# Patient Record
Sex: FEMALE | Race: WHITE | NOT HISPANIC OR LATINO | Employment: OTHER | URBAN - METROPOLITAN AREA
[De-identification: names, ages, dates, MRNs, and addresses within clinical notes are randomized per-mention and may not be internally consistent; named-entity substitution may affect disease eponyms.]

---

## 2024-03-06 RX ORDER — OXYCODONE HYDROCHLORIDE 5 MG/1
5 TABLET ORAL EVERY 4 HOURS PRN
COMMUNITY

## 2024-03-06 ASSESSMENT — ACTIVITIES OF DAILY LIVING (ADL)
ADL_SCORE: 12
SENSORY_SUPPORT_DEVICES: EYEGLASSES;DENTURES
ADL_BEFORE_ADMISSION: INDEPENDENT

## 2024-03-07 ENCOUNTER — ANESTHESIA EVENT (OUTPATIENT)
Dept: SURGERY | Age: 69
End: 2024-03-07

## 2024-03-07 ENCOUNTER — ANESTHESIA (OUTPATIENT)
Dept: SURGERY | Age: 69
End: 2024-03-07

## 2024-03-07 ENCOUNTER — HOSPITAL ENCOUNTER (OUTPATIENT)
Age: 69
Discharge: HOME | End: 2024-03-07
Attending: STUDENT IN AN ORGANIZED HEALTH CARE EDUCATION/TRAINING PROGRAM | Admitting: STUDENT IN AN ORGANIZED HEALTH CARE EDUCATION/TRAINING PROGRAM

## 2024-03-07 DIAGNOSIS — D64.9 ANEMIA, UNSPECIFIED TYPE: Primary | ICD-10-CM

## 2024-03-07 LAB
ATRIAL RATE (BPM): 76
HGB BLD-MCNC: 11.4 G/DL (ref 12–15.5)
P AXIS (DEGREES): 52
PR-INTERVAL (MSEC): 144
QRS-INTERVAL (MSEC): 84
QT-INTERVAL (MSEC): 400
QTC: 450
R AXIS (DEGREES): 29
REPORT TEXT: NORMAL
T AXIS (DEGREES): 7
VENTRICULAR RATE EKG/MIN (BPM): 76

## 2024-03-07 PROCEDURE — 13000002 HB ANESTHESIA  GENERAL  S/U + 1ST 15 MIN: Performed by: STUDENT IN AN ORGANIZED HEALTH CARE EDUCATION/TRAINING PROGRAM

## 2024-03-07 PROCEDURE — 10002807 HB RX 258: Performed by: ANESTHESIOLOGY

## 2024-03-07 PROCEDURE — 10004452 HB PACU ADDL 30 MINUTES: Performed by: STUDENT IN AN ORGANIZED HEALTH CARE EDUCATION/TRAINING PROGRAM

## 2024-03-07 PROCEDURE — 10004451 HB PACU RECOVERY 1ST 30 MINUTES: Performed by: STUDENT IN AN ORGANIZED HEALTH CARE EDUCATION/TRAINING PROGRAM

## 2024-03-07 PROCEDURE — 10002800 HB RX 250 W HCPCS

## 2024-03-07 PROCEDURE — 10002803 HB RX 637: Performed by: STUDENT IN AN ORGANIZED HEALTH CARE EDUCATION/TRAINING PROGRAM

## 2024-03-07 PROCEDURE — 13000003 HB ANESTHESIA  GENERAL EA ADD MINUTE: Performed by: STUDENT IN AN ORGANIZED HEALTH CARE EDUCATION/TRAINING PROGRAM

## 2024-03-07 PROCEDURE — 10002800 HB RX 250 W HCPCS: Performed by: ANESTHESIOLOGY

## 2024-03-07 PROCEDURE — 88305 TISSUE EXAM BY PATHOLOGIST: CPT | Performed by: STUDENT IN AN ORGANIZED HEALTH CARE EDUCATION/TRAINING PROGRAM

## 2024-03-07 PROCEDURE — 85018 HEMOGLOBIN: CPT

## 2024-03-07 PROCEDURE — 13000034 HB BASIC CASE  S/U +1ST 15 MIN: Performed by: STUDENT IN AN ORGANIZED HEALTH CARE EDUCATION/TRAINING PROGRAM

## 2024-03-07 PROCEDURE — 93005 ELECTROCARDIOGRAM TRACING: CPT

## 2024-03-07 PROCEDURE — 13000001 HB PHASE II RECOVERY EA 30 MINUTES: Performed by: STUDENT IN AN ORGANIZED HEALTH CARE EDUCATION/TRAINING PROGRAM

## 2024-03-07 PROCEDURE — 10002801 HB RX 250 W/O HCPCS: Performed by: ANESTHESIOLOGY

## 2024-03-07 PROCEDURE — 10002801 HB RX 250 W/O HCPCS

## 2024-03-07 PROCEDURE — 13000035 HB BASIC CASE EA ADD MINUTE: Performed by: STUDENT IN AN ORGANIZED HEALTH CARE EDUCATION/TRAINING PROGRAM

## 2024-03-07 RX ORDER — MIDAZOLAM HYDROCHLORIDE 1 MG/ML
INJECTION, SOLUTION INTRAMUSCULAR; INTRAVENOUS PRN
Status: DISCONTINUED | OUTPATIENT
Start: 2024-03-07 | End: 2024-03-07

## 2024-03-07 RX ORDER — SODIUM CHLORIDE, SODIUM LACTATE, POTASSIUM CHLORIDE, CALCIUM CHLORIDE 600; 310; 30; 20 MG/100ML; MG/100ML; MG/100ML; MG/100ML
INJECTION, SOLUTION INTRAVENOUS CONTINUOUS
Status: DISCONTINUED | OUTPATIENT
Start: 2024-03-07 | End: 2024-03-07 | Stop reason: HOSPADM

## 2024-03-07 RX ORDER — SODIUM CHLORIDE 9 MG/ML
INJECTION, SOLUTION INTRAVENOUS CONTINUOUS
Status: DISCONTINUED | OUTPATIENT
Start: 2024-03-07 | End: 2024-03-07 | Stop reason: HOSPADM

## 2024-03-07 RX ORDER — HYDRALAZINE HYDROCHLORIDE 20 MG/ML
5 INJECTION INTRAMUSCULAR; INTRAVENOUS EVERY 10 MIN PRN
Status: DISCONTINUED | OUTPATIENT
Start: 2024-03-07 | End: 2024-03-07 | Stop reason: HOSPADM

## 2024-03-07 RX ORDER — PROPOFOL 10 MG/ML
INJECTION, EMULSION INTRAVENOUS PRN
Status: DISCONTINUED | OUTPATIENT
Start: 2024-03-07 | End: 2024-03-07

## 2024-03-07 RX ORDER — SODIUM CHLORIDE, SODIUM LACTATE, POTASSIUM CHLORIDE, CALCIUM CHLORIDE 600; 310; 30; 20 MG/100ML; MG/100ML; MG/100ML; MG/100ML
INJECTION, SOLUTION INTRAVENOUS CONTINUOUS PRN
Status: DISCONTINUED | OUTPATIENT
Start: 2024-03-07 | End: 2024-03-07

## 2024-03-07 RX ORDER — LIDOCAINE HYDROCHLORIDE 10 MG/ML
5-10 INJECTION, SOLUTION EPIDURAL; INFILTRATION; INTRACAUDAL; PERINEURAL PRN
Status: DISCONTINUED | OUTPATIENT
Start: 2024-03-07 | End: 2024-03-07 | Stop reason: HOSPADM

## 2024-03-07 RX ORDER — OXYCODONE HYDROCHLORIDE 5 MG/1
5 TABLET ORAL EVERY 4 HOURS PRN
Status: DISCONTINUED | OUTPATIENT
Start: 2024-03-07 | End: 2024-03-07 | Stop reason: HOSPADM

## 2024-03-07 RX ORDER — ONDANSETRON 2 MG/ML
INJECTION INTRAMUSCULAR; INTRAVENOUS PRN
Status: DISCONTINUED | OUTPATIENT
Start: 2024-03-07 | End: 2024-03-07

## 2024-03-07 RX ORDER — DEXAMETHASONE SODIUM PHOSPHATE 4 MG/ML
INJECTION, SOLUTION INTRA-ARTICULAR; INTRALESIONAL; INTRAMUSCULAR; INTRAVENOUS; SOFT TISSUE PRN
Status: DISCONTINUED | OUTPATIENT
Start: 2024-03-07 | End: 2024-03-07

## 2024-03-07 RX ADMIN — HYDROMORPHONE HYDROCHLORIDE 0.2 MG: 1 INJECTION, SOLUTION INTRAMUSCULAR; INTRAVENOUS; SUBCUTANEOUS at 13:36

## 2024-03-07 RX ADMIN — HYDROMORPHONE HYDROCHLORIDE 0.2 MG: 1 INJECTION, SOLUTION INTRAMUSCULAR; INTRAVENOUS; SUBCUTANEOUS at 13:42

## 2024-03-07 RX ADMIN — FENTANYL CITRATE 25 MCG: 50 INJECTION INTRAMUSCULAR; INTRAVENOUS at 13:38

## 2024-03-07 RX ADMIN — SODIUM CHLORIDE, POTASSIUM CHLORIDE, SODIUM LACTATE AND CALCIUM CHLORIDE: 600; 310; 30; 20 INJECTION, SOLUTION INTRAVENOUS at 12:34

## 2024-03-07 RX ADMIN — FENTANYL CITRATE 100 MCG: 50 INJECTION INTRAMUSCULAR; INTRAVENOUS at 12:39

## 2024-03-07 RX ADMIN — SODIUM CHLORIDE, POTASSIUM CHLORIDE, SODIUM LACTATE AND CALCIUM CHLORIDE: 600; 310; 30; 20 INJECTION, SOLUTION INTRAVENOUS at 10:32

## 2024-03-07 RX ADMIN — OXYCODONE HYDROCHLORIDE 5 MG: 5 TABLET ORAL at 14:46

## 2024-03-07 RX ADMIN — HYDROMORPHONE HYDROCHLORIDE 0.2 MG: 1 INJECTION, SOLUTION INTRAMUSCULAR; INTRAVENOUS; SUBCUTANEOUS at 13:30

## 2024-03-07 RX ADMIN — PROPOFOL 100 MG: 10 INJECTION, EMULSION INTRAVENOUS at 12:40

## 2024-03-07 RX ADMIN — FENTANYL CITRATE 25 MCG: 50 INJECTION INTRAMUSCULAR; INTRAVENOUS at 13:21

## 2024-03-07 RX ADMIN — ONDANSETRON 4 MG: 2 INJECTION INTRAMUSCULAR; INTRAVENOUS at 12:44

## 2024-03-07 RX ADMIN — MIDAZOLAM HYDROCHLORIDE 2 MG: 1 INJECTION, SOLUTION INTRAMUSCULAR; INTRAVENOUS at 12:39

## 2024-03-07 RX ADMIN — DEXAMETHASONE SODIUM PHOSPHATE 4 MG: 4 INJECTION INTRA-ARTICULAR; INTRALESIONAL; INTRAMUSCULAR; INTRAVENOUS; SOFT TISSUE at 12:44

## 2024-03-07 RX ADMIN — HYDROMORPHONE HYDROCHLORIDE 0.2 MG: 1 INJECTION, SOLUTION INTRAMUSCULAR; INTRAVENOUS; SUBCUTANEOUS at 13:55

## 2024-03-07 ASSESSMENT — PAIN SCALES - GENERAL
PAINLEVEL_OUTOF10: 3
PAINLEVEL_OUTOF10: 7
PAINLEVEL_OUTOF10: 7
PAINLEVEL_OUTOF10: 0
PAINLEVEL_OUTOF10: 6
PAINLEVEL_OUTOF10: 7
PAINLEVEL_OUTOF10: 6
PAINLEVEL_OUTOF10: 0
PAINLEVEL_OUTOF10: 7
PAINLEVEL_OUTOF10: 7
PAINLEVEL_OUTOF10: 5
PAINLEVEL_OUTOF10: 4

## 2024-03-07 ASSESSMENT — PAIN DESCRIPTION - PAIN TYPE
TYPE: ACUTE PAIN

## 2024-03-08 VITALS
WEIGHT: 139.99 LBS | HEIGHT: 67 IN | RESPIRATION RATE: 16 BRPM | OXYGEN SATURATION: 97 % | BODY MASS INDEX: 21.97 KG/M2 | SYSTOLIC BLOOD PRESSURE: 135 MMHG | TEMPERATURE: 97.4 F | DIASTOLIC BLOOD PRESSURE: 73 MMHG | HEART RATE: 60 BPM

## 2024-03-12 LAB
ASR DISCLAIMER: NORMAL
CASE RPRT: NORMAL
CLINICAL INFO: NORMAL
PATH REPORT.FINAL DX SPEC: NORMAL
PATH REPORT.GROSS SPEC: NORMAL

## 2024-05-02 RX ORDER — CEFAZOLIN SODIUM/WATER 2 G/20 ML
2 SYRINGE (ML) INTRAVENOUS ONCE
Status: CANCELLED | OUTPATIENT
Start: 2024-05-02 | End: 2024-05-02

## 2024-05-02 RX ORDER — LIDOCAINE AND PRILOCAINE 25; 25 MG/G; MG/G
CREAM TOPICAL AS NEEDED
COMMUNITY
End: 2024-05-24

## 2024-05-02 RX ORDER — TRAMADOL HYDROCHLORIDE 50 MG/1
50 TABLET ORAL EVERY 6 HOURS PRN
COMMUNITY
End: 2024-05-24

## 2024-05-20 ENCOUNTER — ANESTHESIA EVENT (OUTPATIENT)
Dept: SURGERY | Facility: HOSPITAL | Age: 69
DRG: 747 | End: 2024-05-20

## 2024-05-22 NOTE — H&P
[facility]: GYNECOLOGIC ONCOLOGY H&P     MEDICAL RECORD #: [id number] DATE OF SERVICE: 5/22/2024             REASON FOR VISIT:   Vulvar cancer        HISTORY OF PRESENT ILLNESS:   Kaia Francis is a 68 year old female who has had a  ~9 month  history of  vulvar lesion and labial cyst . Pt has mainly had itching which caused irritation and burning with urination. Pt underwent EUA, biopsy of mass and I&D of cyst on 3/7/24 with Dr. Lamas. 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      Went through menopause in her 40s, denies PMB, denies HRT.  Patient was referred by Dr. Lamas.     INTERIM HISTORY:  CT C/A/P showing pulmonary micronodules, hepatic cirrhosis, mild endometrial heterogeneity     PET scan showing mildly hypermetabolic left inguinal lymph node, hepatic cirrhosis     PAST MEDICAL HISTORY:  No past medical history on file.  Hep C Cirrhosis   Smoker      SURGICAL HISTORY:   No past surgical history on file.     ALLERGIES:     Acetaminophen           HIVES     MEDICATIONS:  traMADol 50 MG Oral Tab, Take 1 tablet (50 mg total) by mouth every 12 (twelve) hours as needed for Pain., Disp: 5 tablet, Rfl: 0  lidocaine 5 % External Ointment, Apply 1 g topically as needed., Disp: 30 g, Rfl: 0  oxyCODONE 5 MG Oral Tab, Take 1 tablet (5 mg total) by mouth every 6 (six) hours as needed for Pain., Disp: 20 tablet, Rfl: 0  [DISCONTINUED] Lidocaine 0.5 % External Gel, Apply 1 g topically daily as needed., Disp: 170 g, Rfl: 0     No facility-administered encounter medications on file as of 4/24/2024.        GYNECOLOGIC HISTORY:  No LMP recorded.  Patient reports that she is not currently sexually active.     LAST PAP:   Unknown, reports no h/o abnormal paps     PATHOLOGY:  Pathologic Diagnosis     A.   Left vulvar cyst:  -Epidermoid cyst.     B.   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         TUMOR  MARKERS:     No results found for: \"\"  No results found for: \"CEA\"  No results found for: \"\"        LAST SCREENING MAMMOGRAM:  none     RADIOLOGY:  DATE OF SERVICE: 04.15.2024  CT CHEST+ABDOMEN+PELVIS(ALL [facility]ST ONLY)(UGF=[id number])    CLINICAL INDICATION: Vulvar cancer, carcinoma (HCC).    TECHNIQUE:  Axial images of the chest, abdomen and pelvis were performed from the lung apices  through the pubic symphysis after the injection of nonionic intravenous contrast.  Oral contrast was   used for the examination. 80 of Isovue 370 was administered intravenously.    Automated exposure control and ALARA manual techniques for patient specific dose reduction were  followed while maintaining the necessary diagnostic image quality.    ADVERSE REACTION: None.    COMPARISON: None.    FINDINGS:    CHEST:    - LUNGS:  The airways are unremarkable. There is no focal consolidation, pleural effusion, or  pneumothorax.  Subsegmental atelectasis/scarring in the right lower lobe. There are a few scattered  pulmonary micronodules measuring less than 4 mm size. Subsegmental atelectasis in the right middle  lobe and lingula.    - MEDIASTINUM: The heart is normal in size.  There is no pericardial effusion.  Moderate coronary  artery calcifications are present, a marker of coronary artery disease.  No evidence of mediastinal or hilar lymphadenopathy.  The thoracic aorta is normal in size.  Moderate atherosclerotic disease affects the thoracic aorta.  The main pulmonary artery is normal in caliber.  Subcentimeter hypodensities in the thyroid gland.    - OTHER: No supraclavicular, axillary, retrocrural, or cardiophrenic lymphadenopathy.    - CHEST WALL: Unremarkable except for degenerative changes.      ABDOMEN/PELVIS:    - LIVER: Heterogeneous enhancement liver with suggestion of micronodular contour suspicious for  hepatic cirrhosis. Subcentimeter inferior right hepatic lobe lesion is too small to accurately  characterize.  -  GALLBLADDER/BILIARY TREE: Cholelithiasis.  The common bile duct is normal in caliber.    - PANCREAS: No significant abnormality noted.  - SPLEEN: Spleen is mildly enlarged.    - ADRENAL GLANDS: No significant abnormality noted.  - KIDNEYS: 2.0 cm left renal cyst with probable left extrarenal pelvis.  - BLADDER: The bladder is collapsed limiting evaluation.    - STOMACH: No significant abnormality noted.  - DUODENUM/SMALL BOWEL: No significant abnormality noted.  - COLON: No significant abnormality noted.  The appendix is normal in appearance.    - REPRODUCTIVE ORGANS: Mild heterogeneity of endometrium greater than expected for postmenopausal  age.  - VASCULATURE: No evidence of abdominal aortic aneurysm.  Moderate atherosclerotic disease of the  abdominal aorta. Coarse calcifications at the origin of the right renal artery.Recanalized umbilical  vein draining into the inferior epigastric veins.    - LYMPH NODES: Small partially calcified lymph nodes in the left groin. Increased in number mildly  prominent periportal and gastrohepatic lymph nodes. No bulky lymphadenopathy in the abdomen or  pelvis.    - OTHER: No evidence of free fluid in the abdomen and pelvis.  - OSSEOUS STRUCTURES:  Grade 1 anterolisthesis of L4 relative to L5. Severe degenerative disc  disease at L4-L5.    Impression   IMPRESSION:    1. No definitive evidence of metastatic disease in the chest, abdomen, or pelvis.  2. A few scattered pulmonary micronodules measuring less than 4 mm in size, nonspecific. In lieu of  cancer history and in absence of prior imaging, three-month follow-up advised.  3. Suspected hepatic cirrhosis with stigmata of portal hypertension including mild splenomegaly.  4. Increase in number prominent gastrohepatic and periportal lymph nodes nonspecific in the setting  of chronic liver disease. No bulky abdominopelvic lymphadenopathy.  5. Mild heterogeneity of endometrium appears unexpected for postmenopausal age.      DATE OF  SERVICE: 04.23.2024  WHOLE BODY PET/CT TUMOR IMAGING    CLINICAL INDICATION: Vulvar cancer, carcinoma (HCC)    COMPARISON: 4/15/2024 CT.    TECHNIQUE:  PET-CT imaging was performed after IV administration of 11.15 mCi of F-18 FDG, with an  uptake time of 90 minutes.  Tomographic images were obtained from the orbits through the thighs,  with the patient in the supine position.  The patient's blood glucose level at the time of the exam  was 118 dL/mg.      The CT portion of the exam was performed for attenuation correction purposes only (not for  diagnostic interpretation). Because of the low-radiation-dose CT protocol, the necessity of CT  imaging at functional reserve lung capacity, and lack of administration of oral or intravenous  contrast medium, the CT portion of this study is of less than optimal diagnostic quality. The CT  protocol used for this PET/CT study is designed for attenuation correction and anatomic localization  of PET abnormalities. This  CT is not designed to produce, and cannot replace,  state-of-the-art diagnostic CT scans with specific imaging protocols for different body parts and  indications.    The standardized uptake values (SUV) are normalized to patient body weight and indicate the highest  activity concentration (SUV max) in a given disease site.    Automated exposure control and ALARA manual techniques for patient specific dose reduction were  followed while maintaining the necessary diagnostic image quality.    FINDINGS:    HEAD/NECK: No hypermetabolic lymph nodes are found in the neck.    CHEST: No hypermetabolic lymph nodes are found in the mediastinum, alfonso, or axillary regions  bilaterally. No hypermetabolic pulmonary nodules are found. Atherosclerotic calcifications of the  aorta, visceral arteries, and coronary arteries are seen.      ABDOMEN AND PELVIS:    Mildly hypermetabolic left inguinal lymph nodes are noted with calcifications (measuring 7 mm on  image 222 series  2 with SUV max of 3.9 and measuring 5 mm on image 204 series 2 with a few max of  2.5). There is focal vulvar tracer uptake with SUV max of 9.2 on image 227 series 12. Hepatic  surface nodularity is noted with periportal widening and right renal notching. Cholelithiasis is  noted. 2 cm left renal lesion is seen likely a cyst.    SKELETON: No suspicious focal areas of abnormal FDG uptake are detected in the visualized portions  of the skeleton.    Impression   IMPRESSION:    1. Mildly hypermetabolic left inguinal lymph nodes suspicious for metastatic disease.  2. Focal vulvar tracer uptake likely corresponds to patient's known malignancy or less likely urine  contamination.  3. Morphologic findings of hepatic cirrhosis.  4. Cholelithiasis.            OBSTETRIC HISTORY:   OB History     T2    L0    SAB0  IAB0  Ectopic0  Multiple0  Live Births0      SOCIAL HISTORY:  Patient lives at [place], with family.     Ambulatory Status:  independent  Performance Status:  0 - Fully active, able to carry on all pre-disease performance without restriction.     Social History    Socioeconomic History      Marital status: Single      Spouse name: Not on file      Number of children: Not on file      Years of education: Not on file      Highest education level: Not on file    Occupational History      Not on file    Tobacco Use      Smoking status: Every Day        Packs/day: .5        Types: Cigarettes      Smokeless tobacco: Never    Vaping Use      Vaping Use: Never used    Substance and Sexual Activity      Alcohol use: Never      Drug use: Never      Sexual activity: Not Currently    Other Topics      Concerns:        Not on file    Social History Narrative      Not on file     Social Determinants of Health  Financial Resource Strain: Not on file  Food Insecurity: Not on file  Transportation Needs: Not on file  Physical Activity: Not on file  Stress: Not on file  Social Connections: Not on file  Intimate  Partner Violence: Not on file  Housing Stability: Not on file     FAMILY HISTORY:        Family History   Problem Relation Age of Onset    Other (lung cancer) Father      Other (lung cancer) Paternal Grandmother           REVIEW OF SYSTEMS:  General ROS: negative  Ophthalmic ROS: negative  ENT ROS: negative  Cardiovascular ROS: no chest pain or dyspnea on exertion  Respiratory ROS: no cough, shortness of breath, or wheezing  Gastrointestinal ROS: no abdominal pain, change in bowel habits, or black or bloody stools  Genito-Urinary ROS: no dysuria, trouble voiding, or hematuria  Musculoskeletal ROS: negative  Neurological ROS: no TIA or stroke symptoms  Psychological ROS: negative  Endocrine ROS: negative  Hematological and Lymphatic ROS: negative     PHYSICAL EXAMINATION:  /80   Ht 5' 7\" (1.702 m)   Wt 140 lb (63.5 kg)   BMI 21.93 kg/m²   Body mass index is 21.93 kg/m².     GENERAL: alert and oriented, cooperative, in no acute distress  HEENT: extra ocular movement intact  THYROID: normal to inspection and palpation  LYMPH NODES: Cervical, supraclavicular, and axillary nodes normal. Shotty left sided inguinal LN palpable   LUNGS: clear to auscultation bilaterally  HEART: regular rate and rhythm  ABDOMEN: soft, non-tender. Bowel sounds normal. No masses,  no organomegaly  EXTREMITIES: extremities normal, atraumatic, no cyanosis or edema  NEUROLOGIC: motor grossly intact  VULVA: labial cyst s/p drainage and healing well, left sided clitoral ruiz mass 2 x 2 cm, friable, tender    URETHRA: normal without discharge or scarring  VAGINA: normal mucosa without prolapse or lesions  CERVIX: multiparous appearance and no lesions  UTERUS: normal single, nontender  LEFT ADNEXA: non palpable  RIGHT ADNEXA:  non palpable  RECTOVAGINAL: deferred  BACK: symmetric, no curvature. ROM normal. No CVA tenderness.     ASSESSMENT:  Diagnoses and all orders for this visit:     Vulvar cancer, carcinoma (HCC)       Vulvar pain           68 year old female with  ~9 month  history of  vulvar lesion and labial cyst . Pt has mainly had itching which caused irritation and burning with urination.     S/p EUA, biopsy of mass and I&D of cyst on 3/7/24 with Dr. Lamas.      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      CT C/A/P showing pulmonary micronodules, hepatic cirrhosis, mild endometrial heterogeneity     PET scan showing mildly focal hypermetabolic left inguinal lymph node, hepatic cirrhosis     The index of suspicion for malignancy is confirmed     Comorbidities: H/O smoking, ? Cirrhosis, limited medical care      PLAN:    Patient given options of alternative treatment/surgical intervention including surgical removal vs. Treatment with vulvar/groin/pelvic EBRT with radiosensitizing chemotherapy. We discussed pros and cons of each treatment. We also discussed with patient given there is not widespread metastatic disease surgical removal is reasonable. Discussed with patient given the proximity to the clitoris there will be loss of clitoral function. Pt is aware and would like to proceed with surgery. Discussed pending final pathology she may need adjuvant treatment depending on lymph node status and margin status.     Surgical intervention planned: modified radical vulvectomy with bilateral sentinel inguinal lymph node dissection, left inguinofemoral LND.      Will need hand held Spyfly device and ICG dye      Patient counseled on Risk/Benefits/Alternatives/Indications risk.  Risk discussed includes but not limited to anesthesia, bleeding, death, infection, injury to adjacent organs (including GI, , Vascular and Neurologic Structures), need for further operation, need for further therapy, transfusion, Venous Thrombolytic Disease, wound breakdown dehiscence, lymphedema, possible inability to stage, possible dissemination of disease.     Medical Clearance: obtained by Dr. Philip Lamas   GI  Clearance: seen for Hep C cirrhosis, cleared by Dr. Craig     Discussed with pt smoking is a risk factor for this cancer and may impair wound healing.  Plan for drains after surgery.      The patient verbalized good understanding of this.  I will keep you informed of her progress.  Thank you for allowing me to participate in the care of this patient.        Symone Madison PA-C

## 2024-05-23 ENCOUNTER — ANESTHESIA (OUTPATIENT)
Dept: SURGERY | Facility: HOSPITAL | Age: 69
DRG: 747 | End: 2024-05-23

## 2024-05-23 ENCOUNTER — HOSPITAL ENCOUNTER (INPATIENT)
Facility: HOSPITAL | Age: 69
LOS: 1 days | Discharge: HOME | DRG: 747 | End: 2024-05-24
Attending: OBSTETRICS & GYNECOLOGY | Admitting: OBSTETRICS & GYNECOLOGY

## 2024-05-23 DIAGNOSIS — C51.9 VULVAR CANCER (HCC): Primary | ICD-10-CM

## 2024-05-23 PROCEDURE — 88312 SPECIAL STAINS GROUP 1: CPT | Performed by: OBSTETRICS & GYNECOLOGY

## 2024-05-23 PROCEDURE — 88307 TISSUE EXAM BY PATHOLOGIST: CPT | Performed by: OBSTETRICS & GYNECOLOGY

## 2024-05-23 PROCEDURE — 0UBM0ZZ EXCISION OF VULVA, OPEN APPROACH: ICD-10-PCS | Performed by: OBSTETRICS & GYNECOLOGY

## 2024-05-23 PROCEDURE — 07BJ0ZX EXCISION OF LEFT INGUINAL LYMPHATIC, OPEN APPROACH, DIAGNOSTIC: ICD-10-PCS | Performed by: OBSTETRICS & GYNECOLOGY

## 2024-05-23 PROCEDURE — 07BH0ZX EXCISION OF RIGHT INGUINAL LYMPHATIC, OPEN APPROACH, DIAGNOSTIC: ICD-10-PCS | Performed by: OBSTETRICS & GYNECOLOGY

## 2024-05-23 PROCEDURE — 88309 TISSUE EXAM BY PATHOLOGIST: CPT | Performed by: OBSTETRICS & GYNECOLOGY

## 2024-05-23 PROCEDURE — 88305 TISSUE EXAM BY PATHOLOGIST: CPT | Performed by: OBSTETRICS & GYNECOLOGY

## 2024-05-23 RX ORDER — DIPHENHYDRAMINE HYDROCHLORIDE 50 MG/ML
12.5 INJECTION INTRAMUSCULAR; INTRAVENOUS AS NEEDED
Status: DISCONTINUED | OUTPATIENT
Start: 2024-05-23 | End: 2024-05-23 | Stop reason: HOSPADM

## 2024-05-23 RX ORDER — LIDOCAINE HYDROCHLORIDE 10 MG/ML
INJECTION, SOLUTION EPIDURAL; INFILTRATION; INTRACAUDAL; PERINEURAL AS NEEDED
Status: DISCONTINUED | OUTPATIENT
Start: 2024-05-23 | End: 2024-05-23 | Stop reason: SURG

## 2024-05-23 RX ORDER — KETOROLAC TROMETHAMINE 30 MG/ML
INJECTION, SOLUTION INTRAMUSCULAR; INTRAVENOUS AS NEEDED
Status: DISCONTINUED | OUTPATIENT
Start: 2024-05-23 | End: 2024-05-23 | Stop reason: SURG

## 2024-05-23 RX ORDER — ONDANSETRON 2 MG/ML
4 INJECTION INTRAMUSCULAR; INTRAVENOUS EVERY 8 HOURS PRN
Status: DISCONTINUED | OUTPATIENT
Start: 2024-05-23 | End: 2024-05-24

## 2024-05-23 RX ORDER — BUPIVACAINE HYDROCHLORIDE 2.5 MG/ML
INJECTION, SOLUTION EPIDURAL; INFILTRATION; INTRACAUDAL AS NEEDED
Status: DISCONTINUED | OUTPATIENT
Start: 2024-05-23 | End: 2024-05-23 | Stop reason: HOSPADM

## 2024-05-23 RX ORDER — ENOXAPARIN SODIUM 100 MG/ML
40 INJECTION SUBCUTANEOUS DAILY
Status: DISCONTINUED | OUTPATIENT
Start: 2024-05-24 | End: 2024-05-24

## 2024-05-23 RX ORDER — HYDROMORPHONE HYDROCHLORIDE 1 MG/ML
0.6 INJECTION, SOLUTION INTRAMUSCULAR; INTRAVENOUS; SUBCUTANEOUS EVERY 5 MIN PRN
Status: DISCONTINUED | OUTPATIENT
Start: 2024-05-23 | End: 2024-05-23 | Stop reason: HOSPADM

## 2024-05-23 RX ORDER — ONDANSETRON 2 MG/ML
4 INJECTION INTRAMUSCULAR; INTRAVENOUS EVERY 6 HOURS PRN
Status: DISCONTINUED | OUTPATIENT
Start: 2024-05-23 | End: 2024-05-23 | Stop reason: HOSPADM

## 2024-05-23 RX ORDER — SODIUM CHLORIDE, SODIUM LACTATE, POTASSIUM CHLORIDE, CALCIUM CHLORIDE 600; 310; 30; 20 MG/100ML; MG/100ML; MG/100ML; MG/100ML
INJECTION, SOLUTION INTRAVENOUS CONTINUOUS
Status: DISCONTINUED | OUTPATIENT
Start: 2024-05-23 | End: 2024-05-23 | Stop reason: HOSPADM

## 2024-05-23 RX ORDER — ONDANSETRON 2 MG/ML
INJECTION INTRAMUSCULAR; INTRAVENOUS AS NEEDED
Status: DISCONTINUED | OUTPATIENT
Start: 2024-05-23 | End: 2024-05-23 | Stop reason: SURG

## 2024-05-23 RX ORDER — NALOXONE HYDROCHLORIDE 0.4 MG/ML
0.08 INJECTION, SOLUTION INTRAMUSCULAR; INTRAVENOUS; SUBCUTANEOUS AS NEEDED
Status: DISCONTINUED | OUTPATIENT
Start: 2024-05-23 | End: 2024-05-23 | Stop reason: HOSPADM

## 2024-05-23 RX ORDER — DOCUSATE SODIUM 100 MG/1
100 CAPSULE, LIQUID FILLED ORAL 2 TIMES DAILY
Status: DISCONTINUED | OUTPATIENT
Start: 2024-05-24 | End: 2024-05-24

## 2024-05-23 RX ORDER — ONDANSETRON 4 MG/1
4 TABLET, FILM COATED ORAL EVERY 8 HOURS PRN
Status: DISCONTINUED | OUTPATIENT
Start: 2024-05-23 | End: 2024-05-24

## 2024-05-23 RX ORDER — HYDROMORPHONE HYDROCHLORIDE 1 MG/ML
0.4 INJECTION, SOLUTION INTRAMUSCULAR; INTRAVENOUS; SUBCUTANEOUS EVERY 2 HOUR PRN
Status: DISCONTINUED | OUTPATIENT
Start: 2024-05-23 | End: 2024-05-24

## 2024-05-23 RX ORDER — TRAMADOL HYDROCHLORIDE 50 MG/1
50 TABLET ORAL EVERY 6 HOURS PRN
Status: DISCONTINUED | OUTPATIENT
Start: 2024-05-23 | End: 2024-05-24

## 2024-05-23 RX ORDER — DIPHENHYDRAMINE HYDROCHLORIDE 50 MG/ML
12.5 INJECTION INTRAMUSCULAR; INTRAVENOUS EVERY 4 HOURS PRN
Status: DISCONTINUED | OUTPATIENT
Start: 2024-05-23 | End: 2024-05-24

## 2024-05-23 RX ORDER — SODIUM CHLORIDE, SODIUM LACTATE, POTASSIUM CHLORIDE, CALCIUM CHLORIDE 600; 310; 30; 20 MG/100ML; MG/100ML; MG/100ML; MG/100ML
INJECTION, SOLUTION INTRAVENOUS CONTINUOUS
Status: DISCONTINUED | OUTPATIENT
Start: 2024-05-23 | End: 2024-05-24

## 2024-05-23 RX ORDER — LABETALOL HYDROCHLORIDE 5 MG/ML
5 INJECTION, SOLUTION INTRAVENOUS EVERY 5 MIN PRN
Status: DISCONTINUED | OUTPATIENT
Start: 2024-05-23 | End: 2024-05-23 | Stop reason: HOSPADM

## 2024-05-23 RX ORDER — PHENYLEPHRINE HCL 10 MG/ML
VIAL (ML) INJECTION AS NEEDED
Status: DISCONTINUED | OUTPATIENT
Start: 2024-05-23 | End: 2024-05-23 | Stop reason: SURG

## 2024-05-23 RX ORDER — IBUPROFEN 400 MG/1
800 TABLET ORAL EVERY 6 HOURS PRN
Status: DISCONTINUED | OUTPATIENT
Start: 2024-05-23 | End: 2024-05-24

## 2024-05-23 RX ORDER — HYDROMORPHONE HYDROCHLORIDE 1 MG/ML
0.2 INJECTION, SOLUTION INTRAMUSCULAR; INTRAVENOUS; SUBCUTANEOUS EVERY 5 MIN PRN
Status: DISCONTINUED | OUTPATIENT
Start: 2024-05-23 | End: 2024-05-23 | Stop reason: HOSPADM

## 2024-05-23 RX ORDER — TRAMADOL HYDROCHLORIDE 50 MG/1
100 TABLET ORAL EVERY 6 HOURS PRN
Status: DISCONTINUED | OUTPATIENT
Start: 2024-05-23 | End: 2024-05-24

## 2024-05-23 RX ORDER — ROCURONIUM BROMIDE 10 MG/ML
INJECTION, SOLUTION INTRAVENOUS AS NEEDED
Status: DISCONTINUED | OUTPATIENT
Start: 2024-05-23 | End: 2024-05-23 | Stop reason: SURG

## 2024-05-23 RX ORDER — DEXAMETHASONE SODIUM PHOSPHATE 4 MG/ML
VIAL (ML) INJECTION AS NEEDED
Status: DISCONTINUED | OUTPATIENT
Start: 2024-05-23 | End: 2024-05-23 | Stop reason: SURG

## 2024-05-23 RX ORDER — MEPERIDINE HYDROCHLORIDE 25 MG/ML
12.5 INJECTION INTRAMUSCULAR; INTRAVENOUS; SUBCUTANEOUS AS NEEDED
Status: DISCONTINUED | OUTPATIENT
Start: 2024-05-23 | End: 2024-05-23 | Stop reason: HOSPADM

## 2024-05-23 RX ORDER — HYDROMORPHONE HYDROCHLORIDE 1 MG/ML
0.2 INJECTION, SOLUTION INTRAMUSCULAR; INTRAVENOUS; SUBCUTANEOUS EVERY 2 HOUR PRN
Status: DISCONTINUED | OUTPATIENT
Start: 2024-05-23 | End: 2024-05-24

## 2024-05-23 RX ORDER — GLYCOPYRROLATE 0.2 MG/ML
INJECTION, SOLUTION INTRAMUSCULAR; INTRAVENOUS AS NEEDED
Status: DISCONTINUED | OUTPATIENT
Start: 2024-05-23 | End: 2024-05-23 | Stop reason: SURG

## 2024-05-23 RX ORDER — METOCLOPRAMIDE HYDROCHLORIDE 5 MG/ML
10 INJECTION INTRAMUSCULAR; INTRAVENOUS EVERY 8 HOURS PRN
Status: DISCONTINUED | OUTPATIENT
Start: 2024-05-23 | End: 2024-05-23 | Stop reason: HOSPADM

## 2024-05-23 RX ORDER — HYDROMORPHONE HYDROCHLORIDE 1 MG/ML
0.4 INJECTION, SOLUTION INTRAMUSCULAR; INTRAVENOUS; SUBCUTANEOUS EVERY 5 MIN PRN
Status: DISCONTINUED | OUTPATIENT
Start: 2024-05-23 | End: 2024-05-23 | Stop reason: HOSPADM

## 2024-05-23 RX ORDER — HYDROMORPHONE HYDROCHLORIDE 1 MG/ML
INJECTION, SOLUTION INTRAMUSCULAR; INTRAVENOUS; SUBCUTANEOUS
Status: COMPLETED
Start: 2024-05-23 | End: 2024-05-23

## 2024-05-23 RX ORDER — TRAMADOL HYDROCHLORIDE 50 MG/1
50 TABLET ORAL AS NEEDED
Status: DISCONTINUED | OUTPATIENT
Start: 2024-05-23 | End: 2024-05-23 | Stop reason: HOSPADM

## 2024-05-23 RX ORDER — NEOSTIGMINE METHYLSULFATE 1 MG/ML
INJECTION, SOLUTION INTRAVENOUS AS NEEDED
Status: DISCONTINUED | OUTPATIENT
Start: 2024-05-23 | End: 2024-05-23 | Stop reason: SURG

## 2024-05-23 RX ORDER — HYDROMORPHONE HYDROCHLORIDE 1 MG/ML
0.8 INJECTION, SOLUTION INTRAMUSCULAR; INTRAVENOUS; SUBCUTANEOUS EVERY 2 HOUR PRN
Status: DISCONTINUED | OUTPATIENT
Start: 2024-05-23 | End: 2024-05-24

## 2024-05-23 RX ORDER — CEFAZOLIN SODIUM 1 G/3ML
INJECTION, POWDER, FOR SOLUTION INTRAMUSCULAR; INTRAVENOUS AS NEEDED
Status: DISCONTINUED | OUTPATIENT
Start: 2024-05-23 | End: 2024-05-23 | Stop reason: SURG

## 2024-05-23 RX ORDER — DIPHENHYDRAMINE HYDROCHLORIDE 50 MG/ML
INJECTION INTRAMUSCULAR; INTRAVENOUS
Status: COMPLETED
Start: 2024-05-23 | End: 2024-05-23

## 2024-05-23 RX ADMIN — GLYCOPYRROLATE 0.4 MG: 0.2 INJECTION, SOLUTION INTRAMUSCULAR; INTRAVENOUS at 16:58:00

## 2024-05-23 RX ADMIN — DEXAMETHASONE SODIUM PHOSPHATE 4 MG: 4 MG/ML VIAL (ML) INJECTION at 15:01:00

## 2024-05-23 RX ADMIN — CEFAZOLIN SODIUM 2 G: 1 INJECTION, POWDER, FOR SOLUTION INTRAMUSCULAR; INTRAVENOUS at 15:15:00

## 2024-05-23 RX ADMIN — SODIUM CHLORIDE, SODIUM LACTATE, POTASSIUM CHLORIDE, CALCIUM CHLORIDE: 600; 310; 30; 20 INJECTION, SOLUTION INTRAVENOUS at 14:39:00

## 2024-05-23 RX ADMIN — PHENYLEPHRINE HCL 100 MCG: 10 MG/ML VIAL (ML) INJECTION at 14:46:00

## 2024-05-23 RX ADMIN — NEOSTIGMINE METHYLSULFATE 4 MG: 1 INJECTION, SOLUTION INTRAVENOUS at 16:58:00

## 2024-05-23 RX ADMIN — ONDANSETRON 4 MG: 2 INJECTION INTRAMUSCULAR; INTRAVENOUS at 17:07:00

## 2024-05-23 RX ADMIN — KETOROLAC TROMETHAMINE 30 MG: 30 INJECTION, SOLUTION INTRAMUSCULAR; INTRAVENOUS at 17:07:00

## 2024-05-23 RX ADMIN — ROCURONIUM BROMIDE 50 MG: 10 INJECTION, SOLUTION INTRAVENOUS at 14:43:00

## 2024-05-23 RX ADMIN — LIDOCAINE HYDROCHLORIDE 50 MG: 10 INJECTION, SOLUTION EPIDURAL; INFILTRATION; INTRACAUDAL; PERINEURAL at 14:42:00

## 2024-05-23 NOTE — BRIEF OP NOTE
Pre-Operative Diagnosis: VULVAR CANCER, VULVAR PAIN     Post-Operative Diagnosis: VULVAR CANCER, VULVAR PAIN      Procedure Performed:   MODIFIED RADICAL VULVECTOMY, WITH BILATERAL SENTINEL INGUINAL LYMPH NODE DISSECTION, BILATERAL INGUINAL LYMPH NODE DISSECTION    Surgeons and Role:     * Dariel Farrar MD - Primary    Assistant(s):  Surgical Assistant.: Virgie Giraldo CSA  PA: Symone Madison PA     Surgical Findings: see full op report     Specimen: sent to pathology     Estimated Blood Loss: Blood Output: 50 mL (5/23/2024  4:55 PM)      Dictation Number:  TBD    RONNY Coto  5/23/2024  5:10 PM

## 2024-05-23 NOTE — ANESTHESIA PREPROCEDURE EVALUATION
PRE-OP EVALUATION    Patient Name: Kaia Francis    Admit Diagnosis: VULVAR CANCER, VULVAR PAIN    Pre-op Diagnosis: VULVAR CANCER, VULVAR PAIN    MODIFIED RADICAL VULVECTOMY, WITH BILATERAL SENTINEL INGUINAL LYMPH NODE DISSECTION, LEFT INGUINOFEMORAL LYMPH NODE DISSECTION    Anesthesia Procedure: MODIFIED RADICAL VULVECTOMY, WITH BILATERAL SENTINEL INGUINAL LYMPH NODE DISSECTION, LEFT INGUINOFEMORAL LYMPH NODE DISSECTION  GROIN LYMPH NODE BIOPSY    Surgeons and Role:  Panel 1:     * Dariel Farrar MD - Primary  Panel 2:     * Dariel Farrar MD - Primary    Pre-op vitals reviewed.  Temp: 98.1 °F (36.7 °C)  Pulse: 81  Resp: 18  BP: 125/70  SpO2: 99 %  Body mass index is 21.71 kg/m².    Current medications reviewed.  Hospital Medications:   lactated ringers infusion   Intravenous Continuous       Outpatient Medications:     Medications Prior to Admission   Medication Sig Dispense Refill Last Dose    IBUPROFEN OR Take 3 tablets by mouth at bedtime.   5/22/2024 at     lidocaine-prilocaine 2.5-2.5 % External Cream Apply topically as needed.   Past Week    traMADol 50 MG Oral Tab Take 1 tablet (50 mg total) by mouth every 6 (six) hours as needed for Pain.   Past Week       Allergies: Acetaminophen      Anesthesia Evaluation    Patient summary reviewed.    Anesthetic Complications           GI/Hepatic/Renal                (+) liver disease                 Cardiovascular                                                       Endo/Other                                  Pulmonary                           Neuro/Psych                                      Past Surgical History:   Procedure Laterality Date    Hc surgery catheter eps dx/ablation other than 3d [id number]      vulvar biopsy     Social History     Socioeconomic History    Marital status: Single   Tobacco Use    Smoking status: Some Days     Current packs/day: 0.50     Types: Cigarettes    Smokeless tobacco: Former   Vaping Use    Vaping status: Former   Substance and  Sexual Activity    Alcohol use: Not Currently     Comment: stopped 5 months ago    Drug use: Not Currently     History   Drug Use Unknown     Available pre-op labs reviewed.               Airway      Mallampati: II  Mouth opening: >3 FB  TM distance: > 6 cm  Neck ROM: full Cardiovascular    Cardiovascular exam normal.         Dental             Pulmonary    Pulmonary exam normal.                 Other findings              ASA: 1   Plan: general  NPO status verified and     Post-procedure pain management plan discussed with surgeon and patient.  Surgeon requests: regional block    Plan/risks discussed with: patient                Present on Admission:  **None**

## 2024-05-23 NOTE — ANESTHESIA POSTPROCEDURE EVALUATION
[facility]    Kaia Francis Patient Status:  Inpatient   Age/Gender 68 year old female MRN [id number]   Location [facility] POST ANESTHESIA CARE UNIT Attending Dariel Farrar MD   Hosp Day # 0 PCP Philip Lamas DO       Anesthesia Post-op Note    MODIFIED RADICAL VULVECTOMY, WITH BILATERAL SENTINEL INGUINAL LYMPH NODE DISSECTION, BILATERAL INGUINAL LYMPH NODE DISSECTION    Procedure Summary       Date: 05/23/24 Room / Location:  MAIN OR 09 / [facility] MAIN OR    Anesthesia Start: 1439 Anesthesia Stop: 1722    Procedure: MODIFIED RADICAL VULVECTOMY, WITH BILATERAL SENTINEL INGUINAL LYMPH NODE DISSECTION, BILATERAL INGUINAL LYMPH NODE DISSECTION (Vulva) Diagnosis: (VULVAR CANCER, VULVAR PAIN)    Surgeons: Dariel Farrar MD Anesthesiologist: Emanuel Blackwell MD    Anesthesia Type: general ASA Status: 1            Anesthesia Type: general    Vitals Value Taken Time   /64 05/23/24 1720   Temp 98 05/23/24 1722   Pulse 93 05/23/24 1722   Resp 23 05/23/24 1722   SpO2 98 % 05/23/24 1722   Vitals shown include unfiled device data.    Patient Location: PACU    Anesthesia Type: general    Airway Patency: patent    Postop Pain Control: adequate    Mental Status: preanesthetic baseline    Nausea/Vomiting: none    Cardiopulmonary/Hydration status: stable euvolemic    Complications: no apparent anesthesia related complications    Postop vital signs: stable    Dental Exam: Unchanged from Preop    Patient to be discharged from PACU when criteria met.

## 2024-05-24 VITALS
WEIGHT: 138.63 LBS | SYSTOLIC BLOOD PRESSURE: 114 MMHG | DIASTOLIC BLOOD PRESSURE: 50 MMHG | TEMPERATURE: 98 F | HEIGHT: 67 IN | OXYGEN SATURATION: 99 % | BODY MASS INDEX: 21.76 KG/M2 | RESPIRATION RATE: 19 BRPM | HEART RATE: 80 BPM

## 2024-05-24 LAB
ANION GAP SERPL CALC-SCNC: 4 MMOL/L (ref 0–18)
ANION GAP SERPL CALC-SCNC: 8 MMOL/L (ref 0–18)
BASOPHILS # BLD AUTO: 0.01 X10(3) UL (ref 0–0.2)
BASOPHILS NFR BLD AUTO: 0.2 %
BUN BLD-MCNC: 22 MG/DL (ref 9–23)
BUN BLD-MCNC: 22 MG/DL (ref 9–23)
CALCIUM BLD-MCNC: 8.2 MG/DL (ref 8.5–10.1)
CALCIUM BLD-MCNC: 8.3 MG/DL (ref 8.5–10.1)
CHLORIDE SERPL-SCNC: 111 MMOL/L (ref 98–112)
CHLORIDE SERPL-SCNC: 113 MMOL/L (ref 98–112)
CO2 SERPL-SCNC: 20 MMOL/L (ref 21–32)
CO2 SERPL-SCNC: 21 MMOL/L (ref 21–32)
CREAT BLD-MCNC: 1.06 MG/DL
CREAT BLD-MCNC: 1.07 MG/DL
EGFRCR SERPLBLD CKD-EPI 2021: 57 ML/MIN/1.73M2 (ref 60–?)
EGFRCR SERPLBLD CKD-EPI 2021: 57 ML/MIN/1.73M2 (ref 60–?)
EOSINOPHIL # BLD AUTO: 0.01 X10(3) UL (ref 0–0.7)
EOSINOPHIL NFR BLD AUTO: 0.2 %
ERYTHROCYTE [DISTWIDTH] IN BLOOD BY AUTOMATED COUNT: 14.3 %
GLUCOSE BLD-MCNC: 112 MG/DL (ref 70–99)
GLUCOSE BLD-MCNC: 128 MG/DL (ref 70–99)
HCT VFR BLD AUTO: 31.5 %
HGB BLD-MCNC: 10.3 G/DL
IMM GRANULOCYTES # BLD AUTO: 0.01 X10(3) UL (ref 0–1)
IMM GRANULOCYTES NFR BLD: 0.2 %
LYMPHOCYTES # BLD AUTO: 0.82 X10(3) UL (ref 1–4)
LYMPHOCYTES NFR BLD AUTO: 17.6 %
MCH RBC QN AUTO: 32.5 PG (ref 26–34)
MCHC RBC AUTO-ENTMCNC: 32.7 G/DL (ref 31–37)
MCV RBC AUTO: 99.4 FL
MONOCYTES # BLD AUTO: 0.25 X10(3) UL (ref 0.1–1)
MONOCYTES NFR BLD AUTO: 5.4 %
NEUTROPHILS # BLD AUTO: 3.55 X10 (3) UL (ref 1.5–7.7)
NEUTROPHILS # BLD AUTO: 3.55 X10(3) UL (ref 1.5–7.7)
NEUTROPHILS NFR BLD AUTO: 76.4 %
OSMOLALITY SERPL CALC.SUM OF ELEC: 289 MOSM/KG (ref 275–295)
OSMOLALITY SERPL CALC.SUM OF ELEC: 294 MOSM/KG (ref 275–295)
PLATELET # BLD AUTO: 58 10(3)UL (ref 150–450)
POTASSIUM SERPL-SCNC: 3.9 MMOL/L (ref 3.5–5.1)
POTASSIUM SERPL-SCNC: 5.5 MMOL/L (ref 3.5–5.1)
RBC # BLD AUTO: 3.17 X10(6)UL
SODIUM SERPL-SCNC: 137 MMOL/L (ref 136–145)
SODIUM SERPL-SCNC: 140 MMOL/L (ref 136–145)
WBC # BLD AUTO: 4.7 X10(3) UL (ref 4–11)

## 2024-05-24 PROCEDURE — 80048 BASIC METABOLIC PNL TOTAL CA: CPT | Performed by: PHYSICIAN ASSISTANT

## 2024-05-24 PROCEDURE — 85025 COMPLETE CBC W/AUTO DIFF WBC: CPT | Performed by: PHYSICIAN ASSISTANT

## 2024-05-24 PROCEDURE — 80048 BASIC METABOLIC PNL TOTAL CA: CPT | Performed by: HOSPITALIST

## 2024-05-24 RX ORDER — IBUPROFEN 600 MG/1
600 TABLET ORAL EVERY 6 HOURS PRN
Qty: 20 TABLET | Refills: 0 | Status: SHIPPED | OUTPATIENT
Start: 2024-05-24

## 2024-05-24 RX ORDER — SODIUM CHLORIDE 9 MG/ML
INJECTION, SOLUTION INTRAVENOUS CONTINUOUS
Status: DISCONTINUED | OUTPATIENT
Start: 2024-05-24 | End: 2024-05-24

## 2024-05-24 RX ORDER — PSEUDOEPHEDRINE HCL 30 MG
100 TABLET ORAL 2 TIMES DAILY
Qty: 60 CAPSULE | Refills: 0 | Status: SHIPPED | OUTPATIENT
Start: 2024-05-24

## 2024-05-24 RX ORDER — TRAMADOL HYDROCHLORIDE 50 MG/1
50 TABLET ORAL EVERY 6 HOURS PRN
Qty: 20 TABLET | Refills: 0 | Status: SHIPPED | OUTPATIENT
Start: 2024-05-24

## 2024-05-24 NOTE — DISCHARGE INSTRUCTIONS
Post-operative Instructions for Radical Vulvectomy     Call the office if you are experiencing any of the following:  *Fever more than 101 F  *Shaking chills  *Trouble breathing  *Inability to eat or drink without vomiting  *Pain or foul odor when passing urine  *Warmth, drainage, hardness, or bleeding from your suture line  *Opening of your suture line    The office telephone number is [phone]    Incision care  Make sure to look at your suture line at least twice per day to evaluate the healing process. Clean the suture line with water during your shower. The sutures will dissolve on their own. If you have staples they will be removed during your first post-operative follow-up appointment.    You may shower, no tub bathes. After passing urine and after each bowel movement, rinse the skin with a full bill-bottle of warm tap water then pat dry. Apply antibiotic ointment after each rinse.    Drain care  If you have a drain(s), make sure to wear loose clothing, secure the drain, and do not allow anything to pull on the drain. It should be kept against your body and protected. Make a schedule for emptying your drain and record the amount that is drained each time. Flush the contents of the bulb down the toilet. If the drain is not emptying notify the office.    Pain control  Keep the perineal area clean, dry and open to air as much as possible. Use a sitz bath and then dry with a soft cotton cloth or hair dryer set on “cool” setting. When lying on your side, use a pillow between your knees to improve air flow and comfort. Wear cotton under pants, loose cotton pants, or skirts. Do not cross your legs when sitting or lying down.     Activity  Climb stairs as tolerated.    Gentle exercise and walking as tolerated are encouraged. No strenuous activities for 6 weeks.    Do not lift objects heavier than 10 lbs.    Do not drive a car for at least 2 weeks. If you ride in the car stretch at least every 2 hours.   Do not  put anything in the vagina for at least 6 weeks. If you are sexually active, proceed slowly, use lubricant, and stop if you feel pain or see blood. Call your doctor with extreme pain or bleeding.    Avoid constipation, use stool softener twice daily until incision site is healed.     Follow-up appointment  You should have a follow-up post-operative appointment at two weeks and at six weeks.    If you have any questions regarding your surgery or post-operative recovery contact the office. If you feel you need to be seen urgently, notify the office, and go to the emergency department where your surgery was performed.    You will also need to go to any [facility] on Tuesday, May 28, 2024.

## 2024-05-24 NOTE — PLAN OF CARE
Patient resting in bed. VSS. Mackenzie removed, DTV. Dressing removed, abd pad applied to bilateral groin sites. Khanh approximated. Tolerating diet. POC discussed, all questions and concerns addressed. All safety measures in place.

## 2024-05-24 NOTE — PROGRESS NOTES
Alert and oriented x4. Drowsy. On room air. Continuous pulse ox. Vital signs stable. Lung sounds clear bilaterally.  GI: Abdomen soft, nondistended. Denies nausea. Denies passing gas or belching. Last BM: yesterday 5/23. Bowel sounds hypoactive.  : Sousa- clear, yellow urine  Denies any pain at this time.  Up with standby assist.  Drains: JASON x1 on left; JASON x1 on right. Output is serosanguinous.  Incisions: Lower abdomen with 4x4 and pressure tape. SAE incision. Vagina- bacitracin, telfa, Kerlix, mesh underwear and ice pack.  Diet: Clear liquid diet  IVF running per order. LR @ 100  All appropriate safety measures in place. All questions and concerns addressed    0542 Notified Dr. Farrar regarding decreased urine output in sousa. 250 mL emptied since 1900. Waiting for response.

## 2024-05-24 NOTE — CONSULTS
[facility]   part of [facility]    History and Physical     Kaia Francsi Patient Status:  Inpatient    10/24/1955 MRN [id number]   Location [facility] 3NW-A Attending Dariel Farrar MD   Hosp Day # 1 PCP Philip Lamas DO     Chief Complaint: Invasive vulvar cancer involving clitoris     History of Present Illness: Kaia Francis is a 68 year old female with history of hepatitis C and Invasive vulvar cancer involving clitoris presenting for scheduled modified radical vulvectomy of anterior vulva and clitoris, bilateral cervical injection of indocyanine green dye, bilateral sentinel inguinal lymph node dissection and bilateral inguinal lymph node dissection. Patient denies any preoperative positive review of systems. Patient denies any acute issues. Patient pain controlled.     Past Medical History:  Past Medical History:    Cancer (HCC)    Hepatitis    Hepatitis C- new diagnosis    Visual impairment    Vulvar cancer (HCC)        Past Surgical History:   Past Surgical History:   Procedure Laterality Date    Hc surgery catheter eps dx/ablation other than 3d [id number]      vulvar biopsy       Social History:  reports that she has been smoking cigarettes. She has quit using smokeless tobacco. She reports that she does not currently use alcohol. She reports that she does not currently use drugs.no illegal drugs, not , 2 children, not working, no cane or walker, live with daughter    Family History: History reviewed. No pertinent family history.  Mother passed  Father passed  1 brother passed  1 sister living    Allergies:   Allergies   Allergen Reactions    Acetaminophen HIVES, ITCHING and SWELLING       Medications:    No current facility-administered medications on file prior to encounter.     Current Outpatient Medications on File Prior to Encounter   Medication Sig Dispense Refill    IBUPROFEN OR Take 3 tablets by mouth at bedtime.      lidocaine-prilocaine 2.5-2.5 % External Cream Apply  topically as needed.      traMADol 50 MG Oral Tab Take 1 tablet (50 mg total) by mouth every 6 (six) hours as needed for Pain.         Review of Systems:   A comprehensive 14 point review of systems was completed.    Pertinent positives and negatives noted in the HPI.    Physical Exam:    /50 (BP Location: Left arm)   Pulse 59   Temp 97.9 °F (36.6 °C) (Oral)   Resp 15   Ht 5' 7\" (1.702 m)   Wt 138 lb 9.6 oz (62.9 kg)   SpO2 100%   BMI 21.71 kg/m²   General: No acute distress. Alert and oriented x 3.  HEENT: Normocephalic atraumatic. Moist mucous membranes. EOM-I.  Neck: No JVD. No carotid bruits.  Respiratory: Clear to auscultation bilaterally. No wheezes. No crackles  Cardiovascular: S1, S2. Regular rate and rhythm. No murmurs  Chest and Back: No tenderness or deformity.  Abdomen: Soft, Post surgical tenderness, nondistended.  Positive bowel sounds. No rebound, guarding  Neurologic: No focal neurological deficits. CNII-XII grossly intact. Sensation and strength intact  Musculoskeletal: Moves all extremities.  Extremities: No edema or tenderness of the LE  Integument: No new rashes or lesions.   Psychiatric: Appropriate mood and affect.      Diagnostic Data:      Labs:  Recent Labs   Lab 05/24/24  0620   WBC 4.7   HGB 10.3*   MCV 99.4   PLT 58.0*       Recent Labs   Lab 05/24/24  0620   *   BUN 22   CREATSERUM 1.06*   CA 8.3*      K 5.5*   *   CO2 20.0*       Estimated Creatinine Clearance: 49.4 mL/min (A) (based on SCr of 1.06 mg/dL (H)).    No results for input(s): \"PTP\", \"INR\" in the last 168 hours.    No results for input(s): \"TROP\", \"CK\" in the last 168 hours.    Imaging: Imaging data reviewed in Epic.      ASSESSMENT / PLAN:   68 year old female with history of hepatitis C and Invasive vulvar cancer involving clitoris presenting for scheduled modified radical vulvectomy of anterior vulva and clitoris, bilateral cervical injection of indocyanine green dye, bilateral sentinel  inguinal lymph node dissection and bilateral inguinal lymph node dissection.     Invasive vulvar cancer involving clitoris   -POD # 1 s/p modified radical vulvectomy of anterior vulva and clitoris, bilateral cervical injection of indocyanine green dye, bilateral sentinel inguinal lymph node dissection and bilateral inguinal lymph node dissection.   -gyn/onc following--> ok for dc  -postoperative pain per gyn/onc    Thrombocytopenia  -low as outpt  -likely secondary to hepatitis, cirrhosis    Hyperkalemia  -likely secondary to LR --> stop, change iv to 0.9  -recheck at 5 pm and if not elevated no further intervention needed    Quality:  DVT Prophylaxis: scd, lovenox  CODE status:   Mackenzie:     Plan of care discussed with patient and staff    Dispo: medically stable for discharge if k normalizes latter today     Nate Benitez MD  [facility] Hospitalist  [phone]

## 2024-05-24 NOTE — OPERATIVE REPORT
[facility]    PATIENT'S NAME: SNEHAL CHI   ATTENDING PHYSICIAN: Dariel Farrar MD   OPERATING PHYSICIAN: Dariel Farrar MD   PATIENT ACCOUNT#:   [id number]    LOCATION:  [address] [place] [address]  MEDICAL RECORD #:   [id number]       YOB: 1955  ADMISSION DATE:       05/23/2024      OPERATION DATE:  05/23/2024    OPERATIVE REPORT    PREOPERATIVE DIAGNOSIS:    1.   Invasive vulvar cancer involving clitoris.  2.   Cirrhosis.  3.   Hepatitis C.  4.   Smoker.  POSTOPERATIVE DIAGNOSIS:    1.   Invasive vulvar cancer involving clitoris.  2.   Cirrhosis.  3.   Hepatitis C.  4.   Smoker.  5.   Pending final pathology.  PROCEDURE:  Modified radical vulvectomy of anterior vulva and clitoris, bilateral cervical injection of indocyanine green dye, bilateral sentinel inguinal lymph node dissection, and bilateral inguinal lymph node dissection.    ASSISTANTS:  Symone Madison PA-C and Virgie Giraldo CSA     INTRAVENOUS FLUIDS:  800 mL.    URINE OUTPUT:  150 mL.    ESTIMATED BLOOD LOSS:  50 mL.    DRAINS:  A #7 JASON bilaterally in inguinal lymph node dissection area and Mackenzie catheter.    COMPLICATIONS:  None.    CONDITION:  Stable, to recovery room.     INDICATIONS:  This is a 68-year-old female who has a history of vulvar lesion with pain and irritation.  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  The patient was subsequently referred to me.  The patient had approximately a 2 x 3 cm lesion involving the clitoris.  There were some skin changes, especially on the left side.  It had some satellite lesions which showed at least dysplasia.  The patient underwent a CT scan and PET scan which showed some focal hypermetabolic left inguinal activity.  I counseled the patient on options.  The patient has had limited medical care.  It appears that she was cirrhotic based on imaging and lab work.  We sent her for GI and medical clearance, and she was cleared for surgery.  I  counseled the patient on options including radiation and chemotherapy versus surgical resection.  We did  the patient that there would be possibility that she may need adjuvant therapy with radiation and/or chemotherapy depending on lymph node and margin status.  She was counseled on risks, benefits, alternatives, and indications, and informed consent was obtained.     FINDINGS:  The patient continues to have a 3 x 2 cm lesion in the clitoris with some satellite-type lesions with acetowhite changes, especially at the 3-o'clock area approximately 1 to 1.5 cm from the clitoris.  The remainder of the vulva outside the anterior vulva appears normal.  During the lymph node dissection, sentinel lymph node mapping did occur on both sides only in the sensitive mode.  No particularly enlarged lymph nodes were seen, but sentinel lymph node mapping was performed and sentinel lymph nodes were excised.  There were initial sentinel lymph nodes that were seen on both sides that were in the inguinal region.  Then, there was a second lymph node that appeared to be closer to the medial aspect of the femoral triangle, and additional lymph nodes were removed.  Given possible positivity on PET scan, I did not want to rely entirely on sentinel lymph node mapping.      OPERATIVE TECHNIQUE:  Patient was taken to the operating room where she was given general endotracheal tube intubation anesthesia.  She was prepped and draped in usual sterile fashion in dorsal lithotomy position.  A line was drawn from the anterior superior iliac spine and pubic tubercle.  Her skin had additional laxity and therefore was pulled up.  This helped orient us in the right area over her groin.  We went approximately 4 cm which is lower than I typically would go, but this put us right over her inguinal canal.  An approximately 6 cm incision was made through the skin.  Subcutaneous tissue was  using electrocautery.  Skin hooks were used to create  nice skin flaps, and electrocautery was then used to take down the subcutaneous fat.  We then went on to dissect the inguinal lymph nodes in this area, but in the femoral triangle once the boundaries were established and vessels could be palpated, I used the handheld Spy.  After toggling through the various modes, I was able to identify some sentinel lymph nodes along the superficial lymph node bundles.  These lymph nodes were excised.  Additional lymph nodes were found in the medial pocket of the femoral triangle, and these were excised.  Care was taken not to injure any of the vessels or saphenous vein.  Some additional lymph nodes were seen on that left side, and due to her PET uptake, these lymph nodes were excised.  This area was irrigated.  No ongoing bleed was seen.  Small bleeders had been cauterized.  Floseal was placed.  A #7 JASON was brought in lateral and superior to previous incision, and drain was brought out through it.  Drain was placed in the area of dissection.  Superficial fascia was brought together using 2-0 Vicryl suture.  The exact same dissection was carried out on the right side, and the findings were very similar.  Both drains were connected to bulb suction.  Attention was now turned to the vulva.  The area of concern was outlined with a marking pen.  We gave ourselves approximately 1.5 cm margins along the entire anterior vulva.  Incision was made with a scalpel and carried down through subcutaneous tissue using needle-tip cautery.  Using the small jaw LigaSure, I went on to complete a modified radical vulvectomy up to the level of the fascia.  The entire lesion was excised.  Approximately half of the clitoris was clamped across to get adequate margins from this clitoral mass which appeared to be at least 1.5 to 2 cm.  The clitoral pedicle was ligated using 0 Vicryl suture.  Small bleeders were cauterized.  Deepest layer was brought together using interrupted 2-0 Vicryl sutures.  Second  layer of deeper tissue was also brought together.  Ultimately, the incision came together nicely.  The subcutaneous tissue near the incision was brought together using horizontal mattress 2-0 Vicryl sutures.  Ultimately, the skin was brought together using 2-0 vertical mattress sutures and came together quite nicely.  There was some soft puffiness in the vulvar areas bilaterally but did not appear to be related to a hematoma.  There was no evidence of any bleeding from the JASON drains.  This area was soft and was likely related to the surgery.  We observed this area for approximately 25 minutes as we were working, and there was no evidence of this expanding or having any other concerns.  Bacitracin was applied along with Telfa, fluffs, mesh underwear, and ABDs, 4 x 4's, and pressure bandages were placed on the groin incisions.      I was present and scrubbed for the entire procedure.  Symone Madison PA-C served as assistant and was invaluable in providing traction and countertraction, exposure, and was needed for the safety and efficiency of the procedure.  Patient was extubated and taken to recovery room in satisfactory condition.  She tolerated the procedure well.  Sponge, instrument, and needle counts were reported as correct x2.     Dictated By Dariel Farrar MD  d: 05/23/2024 17:16:31  t: 05/23/2024 20:03:11  Job [id number]  /

## 2024-05-24 NOTE — PROGRESS NOTES
[facility] POST-OPERATIVE VISIT     MEDICAL RECORD #: [id number] DATE OF SERVICE: 5/24/2024             Reason for visit:  Post-operative day one    HISTORY OF PRESENT ILLNESS:  68 year old female with a history of vulvar lesion with pain and irritation. 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  She underwent Modified radical vulvectomy of anterior vulva and clitoris, bilateral cervical injection of indocyanine green dye, bilateral sentinel inguinal lymph node dissection, and bilateral inguinal lymph node dissection on 5/23/24.    GI//GYNE Complaint:  Other complaints: Pending void. Belching, minimal flatus. Pain well controlled. Ambulating well. Tolerating diet, no N/V.    Interim History:  Underwent surgery    Patient's medications, allergies, past medical, surgical, social and family histories were reviewed and updated as appropriate.    Pathology/Lab Results:  Recent Results (from the past 720 hour(s))   Basic Metabolic Panel (8)    Collection Time: 05/24/24  6:20 AM   Result Value Ref Range    Glucose 128 (H) 70 - 99 mg/dL    Sodium 137 136 - 145 mmol/L    Potassium 5.5 (H) 3.5 - 5.1 mmol/L    Chloride 113 (H) 98 - 112 mmol/L    CO2 20.0 (L) 21.0 - 32.0 mmol/L    Anion Gap 4 0 - 18 mmol/L    BUN 22 9 - 23 mg/dL    Creatinine 1.06 (H) 0.55 - 1.02 mg/dL    Calcium, Total 8.3 (L) 8.5 - 10.1 mg/dL    Calculated Osmolality 289 275 - 295 mOsm/kg    eGFR-Cr 57 (L) >=60 mL/min/1.73m2   CBC W/ DIFFERENTIAL    Collection Time: 05/24/24  6:20 AM   Result Value Ref Range    WBC 4.7 4.0 - 11.0 x10(3) uL    RBC 3.17 (L) 3.80 - 5.30 x10(6)uL    HGB 10.3 (L) 12.0 - 16.0 g/dL    HCT 31.5 (L) 35.0 - 48.0 %    PLT 58.0 (L) 150.0 - 450.0 10(3)uL    MCV 99.4 80.0 - 100.0 fL    MCH 32.5 26.0 - 34.0 pg    MCHC 32.7 31.0 - 37.0 g/dL    RDW 14.3 %    Neutrophil Absolute Prelim 3.55 1.50 - 7.70 x10 (3) uL    Neutrophil Absolute 3.55 1.50 - 7.70 x10(3) uL    Lymphocyte  Absolute 0.82 (L) 1.00 - 4.00 x10(3) uL    Monocyte Absolute 0.25 0.10 - 1.00 x10(3) uL    Eosinophil Absolute 0.01 0.00 - 0.70 x10(3) uL    Basophil Absolute 0.01 0.00 - 0.20 x10(3) uL    Immature Granulocyte Absolute 0.01 0.00 - 1.00 x10(3) uL    Neutrophil % 76.4 %    Lymphocyte % 17.6 %    Monocyte % 5.4 %    Eosinophil % 0.2 %    Basophil % 0.2 %    Immature Granulocyte % 0.2 %   Scan slide    Collection Time: 05/24/24  6:20 AM   Result Value Ref Range    Slide Review       Slide reviewed, normal WBC, RBC, and platelet morphology.       Labs reviewed - 5/24/24    Physical examination:    BP (!) 123/97 (BP Location: Left arm)   Pulse 62   Temp 98 °F (36.7 °C) (Oral)   Resp 17   Ht 5' 7\" (1.702 m)   Wt 138 lb 9.6 oz (62.9 kg)   SpO2 100%   BMI 21.71 kg/m²     GENERAL: alert and oriented, cooperative, in no acute distress  ABDOMEN: soft, non-tender. Bowel sounds normal. No masses,  no organomegaly  INCISION/SURGICAL WOUNDS: dressing intact and in place on b/l groin, Devante's with bloody SSF  PELVIC: Pelvic exam: vulvar incision intact.    ASSESSMENT:  Dx: Invasive vulvar cancer involving clitoris.     Cr and K mildly elevated    Hgb with anticipated drop    WBC WNL    Vitals stable    PLAN:    Pending void    Repeat K and Cr at 1700 pending    LMWH, Soft diet, IVF    Possible plan for discharge this evening pending void and improved labs    Wound care and drain care instructions to be given to patient    Follow up in 2 weeks as scheduled     The patient verbalized good understanding of this.  I will keep you informed of her progress.  Thank you for allowing me to participate in the care of this patient.    RONNY Coto  05/24/24

## 2024-05-25 NOTE — PROGRESS NOTES
Patient given education on discharge, wound care, JASON management. Supplies given. Patient left with daughter via wheelchair. Alert and oriented, vital signs stable. Verbal understanding of education by patient. IV removed.

## 2024-05-25 NOTE — PLAN OF CARE
O.k. to discharge per gyn. Discussed with oncoming RN, supplies for dressing changes and JASON drain care will be given to patient.

## (undated) DEVICE — SYRINGE 10ML CNTRL CONC TIP PYROGEN FREE DEHP-FR STRL MED LF

## (undated) DEVICE — GLOVE SURG 6 PROTEXIS LF BLUE PF SMTH BEAD CUFF INTLK STRL

## (undated) DEVICE — MANIFOLD SCT NEPTUNE 2 2 STD 4 PORT WST MGMT SYS NS LF DISP

## (undated) DEVICE — SLEEVE COMPR MD KNEE LEN SGL USE KENDALL SCD

## (undated) DEVICE — NEEDLE HPO 25GA 1.5IN REG WALL REG BVL LL HUB DEHP-FR STRL

## (undated) DEVICE — PROXIMATE RH ROTATING HEAD SKIN STAPLERS (35 WIDE) CONTAINS 35 STAINLESS STEEL STAPLES: Brand: PROXIMATE

## (undated) DEVICE — GLOVE,SURG,SENSICARE SLT,LF,PF,7.5: Brand: MEDLINE

## (undated) DEVICE — CONTAINER SPEC 4OZ 2.5X2.75IN OR POS INDCTR TMPR EVD LEAK

## (undated) DEVICE — GLOVE SURG 6.5 PROTEXIS LF CRM PF BEAD CUFF STRL PLISPRN

## (undated) DEVICE — GLOVE SURG 6 PROTEXIS LF CRM PF BEAD CUFF STRL PLISPRN 11.5

## (undated) DEVICE — Device

## (undated) DEVICE — ELECTRODE PT RTN C30- LB 9FT CORD NONIRRITATE NONSENSITIZE

## (undated) DEVICE — SYRINGE MED 5ML STD CLR PLAS LL TIP N CTRL

## (undated) DEVICE — GLOVE SUR 6.5 SENSICARE PI PIP CRM PWD F

## (undated) DEVICE — COVER LT HNDL RIG FOR SUR CAM DISP

## (undated) DEVICE — LIGASURE EXACT DISSECTOR: Brand: LIGASURE

## (undated) DEVICE — SOLUTION IRRIG 1000ML ST H2O AQUALITE PLAS

## (undated) DEVICE — PENCIL SMKEVC COAT PSHBTN LF

## (undated) DEVICE — GLOVE SUR 7 SENSICARE PI PIP GRN PWD F

## (undated) DEVICE — GOWN SURG XL L3 NONREINFORCE SET IN SLV STRL LF DISP BLUE

## (undated) DEVICE — DRAPE,ROBOTICS,STERILE: Brand: MEDLINE

## (undated) DEVICE — GLOVE SUR 7.5 SENSICARE PI PIP CRM PWD F

## (undated) DEVICE — GLOVE SUR 8 SENSICARE PI PIP GRN PWD F

## (undated) DEVICE — SPONGE GZ 4X4IN COT 12 PLY TYP VII WVN C

## (undated) DEVICE — KIT PROC W/ DRP AND DRG COMP FOR PRTBL HNDHLD

## (undated) DEVICE — GAUZE,SPONGE,FLUFF,6"X6.75",STRL,5/TRAY: Brand: MEDLINE

## (undated) DEVICE — GLOVE SURG 7 PROTEXIS LF CRM PF BEAD CUFF STRL PLISPRN 12IN

## (undated) DEVICE — SOLUTION SURGSCRB 4OZ 4% CHG BTL LIQUID ANTIMICROBIAL

## (undated) DEVICE — STANDARD HYPODERMIC NEEDLE,POLYPROPYLENE HUB: Brand: MONOJECT

## (undated) DEVICE — TRAY SKIN SCRUB 8IN VINYL CTN 6 WING 6 SPNG STK 2 TIP APL

## (undated) DEVICE — GOWN SURG LG L3 NONREINFORCE SET IN SLV STRL LF DISP BLUE

## (undated) DEVICE — PAD DRSG 8X3IN CRD POLY CTN ABS PERFORATE FILM LF STRL

## (undated) DEVICE — DRAPE,UNDRBUT,WHT GRAD PCH,CAPPORT,20/CS: Brand: MEDLINE

## (undated) DEVICE — GYN LAP/ROBOTIC: Brand: MEDLINE INDUSTRIES, INC.

## (undated) DEVICE — SUT COAT VCRL 2-0 36IN ABSRB VLT 36MM CT-1

## (undated) DEVICE — SUT PERMA- 2-0 30IN FSL NABSRB BLK L30MM 3

## (undated) DEVICE — GAUZE,SPONGE,4"X4",12PLY,STERILE,LF,2'S: Brand: MEDLINE

## (undated) DEVICE — PENCIL SMK EVAC L10FT MPLR BLDE JAW OPN

## (undated) DEVICE — SOLUTION IRRIG 1000ML 0.9% NACL USP BTL

## (undated) DEVICE — SOLUTION IRR 1000ML 0.9% NACL PLASTIC POUR BTL ISTNC N-PYRG

## (undated) DEVICE — DRAIN SURG W7MMXL20CM SIL 3/4 PERF FLAT

## (undated) DEVICE — SURGIFLO ENDOSCOPIC APPICATOR: Brand: ETHICON

## (undated) DEVICE — PAD,NON-ADHERENT,3X8,STERILE,LF,1/PK: Brand: MEDLINE

## (undated) DEVICE — VIOLET BRAIDED (POLYGLACTIN 910), SYNTHETIC ABSORBABLE SUTURE: Brand: COATED VICRYL

## (undated) DEVICE — PUNCH 3MM SKIN RZR SHARP RTU RB HNDL BIOPSY ACU-PUNCH STRL

## (undated) DEVICE — E-Z CLEAN, NON-STICK, PTFE COATED, ELECTROSURGICAL NEEDLE ELECTRODE, MODIFIED EXTENDED INSULATION, 2.75 INCH (7 CM): Brand: MEGADYNE

## (undated) DEVICE — NEEDLE SPNL 20GA L3.5IN YEL QNCKE STYL DISP

## (undated) DEVICE — PREMIUM WET SKIN PREP TRAY: Brand: MEDLINE INDUSTRIES, INC.

## (undated) DEVICE — GLOVE SURG 7 PROTEXIS LF BLUE PF SMTH BEAD CUFF INTLK STRL

## (undated) DEVICE — PAD,ABDOMINAL,8"X7.5",ST,LF,20/BX: Brand: MEDLINE INDUSTRIES, INC.

## (undated) DEVICE — 3M™ IOBAN™ 2 ANTIMICROBIAL INCISE DRAPE 6648EZ: Brand: IOBAN™ 2

## (undated) DEVICE — MINI LAP PACK-LF: Brand: MEDLINE INDUSTRIES, INC.

## (undated) NOTE — LETTER
OUTSIDE TESTING RESULT REQUEST     IMPORTANT: FOR YOUR IMMEDIATE ATTENTION  Please FAX all test results listed below to: [phone]     Testing already done on or about within 2 weeks of 24    * * * * If testing is NOT complete, arrange with patient A.S.A.P. * * * *      Patient Name: Kaia Francis  Surgery Date: 2024  Medical Record: [id number]  CSN: [id number]  : 10/24/1955 - A: 68 y     Sex: female  Surgeon(s):  Dariel Farrar MD  Procedure: MODIFIED RADICAL VULVECTOMY, WITH BILATERAL SENTINEL INGUINAL LYMPH NODE DISSECTION, LEFT INGUINOFEMORAL LYMPH NODE DISSECTION  Anesthesia Type: General     Surgeon: Dariel Farrar MD     The following Testing and Time Line are REQUIRED PER ANESTHESIA     CMP (requires 4 hour fast) within  14 days      Thank You,   Sent by:FERNANDO